# Patient Record
Sex: FEMALE | Race: AMERICAN INDIAN OR ALASKA NATIVE
[De-identification: names, ages, dates, MRNs, and addresses within clinical notes are randomized per-mention and may not be internally consistent; named-entity substitution may affect disease eponyms.]

---

## 2020-06-02 ENCOUNTER — HOSPITAL ENCOUNTER (OUTPATIENT)
Dept: HOSPITAL 5 - SPVWC | Age: 50
Discharge: HOME | End: 2020-06-02
Attending: INTERNAL MEDICINE
Payer: COMMERCIAL

## 2020-06-02 DIAGNOSIS — Z12.31: Primary | ICD-10-CM

## 2020-06-02 DIAGNOSIS — Z00.00: ICD-10-CM

## 2020-06-02 DIAGNOSIS — E04.9: ICD-10-CM

## 2020-06-02 PROCEDURE — 77067 SCR MAMMO BI INCL CAD: CPT

## 2020-06-02 PROCEDURE — 76536 US EXAM OF HEAD AND NECK: CPT

## 2020-06-02 NOTE — ULTRASOUND REPORT
ULTRASOUND THYROID



INDICATION / CLINICAL INFORMATION:

ENLARGED THYROID.



COMPARISON: 

None available.



FINDINGS:



RIGHT LOBE: Size = 3.8 x 1.7 x 1.8 cm. 

- Echogenicity: Normal.

- Vascularity: Normal.

- Nodules < 1 cm: An 8 mm hypoechoic nodule is identified near mid pole

- Nodules >= 1 cm or Suspicious Nodules: None.



LEFT LOBE: Size = 4.0 x 1.7 x 1.4 cm. 

- Echogenicity: Normal.

- Vascularity: Normal.

- Nodules < 1 cm: None.

- Nodules >= 1 cm or Suspicious Nodules: None.



ISTHMUS: No significant abnormality. Thickness = 0.3 cm. 

- Nodules < 1 cm: None.

- Nodules >= 1 cm or Suspicious Nodules: None.



LYMPH NODES: No abnormal lymph nodes.



PARATHYROID GLANDS: No abnormal parathyroid gland.



ADDITIONAL FINDINGS: None.



IMPRESSION:

 The thyroid gland is normal size and contour. An 8 mm benign-appearing hypoechoic nodule is noted in
 the right thyroid lobe near mid pole.







Note: Nodule size based on mean (average) size of 3 dimensions.



Note: Nodules < 1 cm do not typically require follow-up or FNA unless there are suspicious features (
JOJO, 2015)



------------------------------------------------------------------------

ACR TI-RADS Thyroid Nodule Recommendations

------------------------------------------------------------------------

TI-RADS 1 (0 points) -- Benign. No FNA or follow-up.

TI-RADS 2 (1-2 points) -- Not suspicious. No FNA or follow-up.

TI-RADS 3 (3 points) -- Mildly suspicious. Follow up in 1 year if  1.5 cm. FNA if  2.5 cm. 

TI-RADS 4 (4-6 points) -- Moderately suspicious. Follow up in 1 year if  1.0 cm. FNA if  1.5 cm. 

TI-RADS 5 (7+ points) -- Highly suspicious. Follow up in 1 year if  0.5 cm. FNA if  1.0 cm. 





Signer Name: Ashok Dolan Jr, MD 

Signed: 6/2/2020 9:03 AM

Workstation Name: [id number]

## 2020-06-02 NOTE — MAMMOGRAPHY REPORT
DIGITAL SCREENING MAMMOGRAM WITH CAD, 6/2/2020



INDICATION: Routine screening mammography. 



TECHNIQUE:  Digital bilateral  2D mammography was obtained in the craniocaudal and mediolateral obliq
ue projections. This examination was interpreted with the benefit of Computer-Aided Detection analysi
s.



COMPARISON: 10/29/2015, 11/11/2013



FINDINGS: 



Breast Density: There are scattered areas of fibroglandular density.



There is no evidence of dominant mass, suspicious calcifications or architectural distortion in eithe
r breast.



IMPRESSION:



Follow up recommendation: Routine yearly



BI-RADS Category 1:  Negative.



A "normal" or negative report should not discourage follow up or biopsy of a clinically significant f
inding.



A written summary of these findings will be mailed to the patient. The patient will be entered into a
 mammography reporting system which will generate a reminder letter for the patient's next appointmen
t at the appropriate interval.



The [employer] recommends yearly mammograms starting at age 40 and continuing as l
[place] as a woman is in good health.  Breast MRI is recommended for women with an approximate 20-25% or 
greater lifetime risk of breast cancer, including women with a strong family history of breast or ova
medardo cancer or who have been treated for Hodgkin's disease.



Signer Name: Kimber Rhodes MD 

Signed: 6/2/2020 10:21 AM

Workstation Name: [email or web address]

## 2021-11-15 ENCOUNTER — HOSPITAL ENCOUNTER (OUTPATIENT)
Dept: HOSPITAL 5 - SPVWC | Age: 51
Discharge: HOME | End: 2021-11-15
Attending: INTERNAL MEDICINE
Payer: COMMERCIAL

## 2021-11-15 DIAGNOSIS — Z12.31: Primary | ICD-10-CM

## 2021-11-15 PROCEDURE — 77067 SCR MAMMO BI INCL CAD: CPT

## 2021-11-15 NOTE — MAMMOGRAPHY REPORT
DIGITAL SCREENING MAMMOGRAM WITH CAD, 11/15/2021



CLINICAL INFORMATION / INDICATION: Routine screening mammography. SCREENING MAMMOGRAM



TECHNIQUE:  Digital bilateral 2D mammography was obtained in the craniocaudal and mediolateral obliqu
e projections. This examination was interpreted with the benefit of Computer-Aided Detection analysis
.



COMPARISON: 6/2/2020, 10/29/2015, 11/11/2013



FINDINGS: 



Breast Density: There are scattered areas of fibroglandular density.



No dominant mass, suspicious calcifications, or architectural distortion in either breast. 





 

IMPRESSION: No mammographic evidence of malignancy.



Follow up recommendation: Routine yearly



BI-RADS Category 1:  Negative.





-------------------------------------------------------------------------------------------

A "normal" or negative report should not discourage follow up or biopsy of a clinically significant f
inding.



A written summary of these findings will be mailed to the patient. The patient will be entered into a
 mammography reporting system which will generate a reminder letter for the patient's next appointmen
t at the appropriate interval.



The [employer] recommends yearly mammograms starting at age 40 and continuing as l
[place] as a woman is in good health.  Breast MRI is recommended for women with an approximate 20-25% or 
greater lifetime risk of breast cancer, including women with a strong family history of breast or ova
medardo cancer or who have been treated for Hodgkin's disease.



Signer Name: Ceasar Adams DO 

Signed: 11/15/2021 9:50 AM

Workstation Name: [email or web address]